# Patient Record
Sex: MALE | Race: WHITE | URBAN - METROPOLITAN AREA
[De-identification: names, ages, dates, MRNs, and addresses within clinical notes are randomized per-mention and may not be internally consistent; named-entity substitution may affect disease eponyms.]

---

## 2017-09-18 ENCOUNTER — HOSPITAL ENCOUNTER (EMERGENCY)
Facility: CLINIC | Age: 37
Discharge: HOME | End: 2017-09-18
Attending: EMERGENCY MEDICINE | Admitting: EMERGENCY MEDICINE

## 2017-09-18 VITALS
HEART RATE: 66 BPM | DIASTOLIC BLOOD PRESSURE: 89 MMHG | SYSTOLIC BLOOD PRESSURE: 143 MMHG | OXYGEN SATURATION: 96 % | TEMPERATURE: 98.3 F | RESPIRATION RATE: 18 BRPM

## 2017-09-18 DIAGNOSIS — M54.6 ACUTE RIGHT-SIDED THORACIC BACK PAIN: Primary | ICD-10-CM

## 2017-09-18 PROCEDURE — 99283 EMERGENCY DEPT VISIT LOW MDM: CPT

## 2017-09-18 PROCEDURE — 25000132 ZZH RX MED GY IP 250 OP 250 PS 637: Performed by: EMERGENCY MEDICINE

## 2017-09-18 RX ORDER — IBUPROFEN 600 MG/1
600 TABLET, FILM COATED ORAL ONCE
Status: COMPLETED | OUTPATIENT
Start: 2017-09-18 | End: 2017-09-18

## 2017-09-18 RX ORDER — LIDOCAINE 50 MG/G
PATCH TOPICAL
Qty: 6 PATCH | Refills: 0 | Status: SHIPPED | OUTPATIENT
Start: 2017-09-18 | End: 2017-12-19

## 2017-09-18 RX ORDER — IBUPROFEN 600 MG/1
600 TABLET, FILM COATED ORAL EVERY 8 HOURS PRN
Qty: 30 TABLET | Refills: 0 | Status: SHIPPED | OUTPATIENT
Start: 2017-09-18

## 2017-09-18 RX ADMIN — IBUPROFEN 600 MG: 600 TABLET ORAL at 08:07

## 2017-09-18 ASSESSMENT — ENCOUNTER SYMPTOMS
BACK PAIN: 1
NUMBNESS: 0

## 2017-09-18 NOTE — LETTER
To Whom it may concern:      Kavon Arnold was seen in our Emergency Department today, 09/18/17.  I expect his condition to improve over the next 2-3 days.  he may return to work/school when improved.    Sincerely,  Delia Talley RN

## 2017-09-18 NOTE — ED AVS SNAPSHOT
[facility] Emergency Department    [address]    [facility] [address]    Phone:  [phone]    Fax:  [phone]                                       Kavon Arnold   MRN: [id number]    Department:  [facility] Emergency Department   Date of Visit:  9/18/2017           Patient Information     Date Of Birth          1980        Your diagnoses for this visit were:     Acute right-sided thoracic back pain        You were seen by Jemal Garcia MD.      Follow-up Information     Follow up with [facility] Emergency Department.    Specialty:  EMERGENCY MEDICINE    Contact information:    [address] [facility] [id number] [phone]        Schedule an appointment as soon as possible for a visit with [facility].    Why:  To establish with primary care provider and follow up with back pain    Contact information:    [address]  [address]  [facility] [id number] [phone]        Discharge Instructions       Discharge Instructions  Back Pain  You were seen today for back pain. Back pain can have many causes, but most will get better without surgery or other specific treatment. Sometimes there is a herniated ( slipped ) disc. We do not usually do MRI scans to look for these right away, since most herniated discs will get better on their own with time.  Today, we did not find any evidence that your back pain was caused by a serious condition. However, sometimes symptoms develop over time and cannot be found during an emergency visit, so it is very important that you follow up with your primary provider.  Generally, every Emergency Department visit should have a follow-up clinic visit with either a primary or a specialty clinic/provider. Please follow-up as instructed by your emergency provider today.    Return to the Emergency Department if:    You develop a fever with your back pain.     You have  weakness or change in sensation in one or both legs.    You lose control of your bowels or bladder, or cannot empty your bladder (cannot pee).    Your pain gets much worse.     Follow-up with your provider:    Unless your pain has completely gone away, please make an appointment with your provider within one week. Most of the routine care for back pain is available in a clinic and not the Emergency Department. You may need further management of your back pain, such as more pain medication, imaging such as an X-ray or MRI, or physical therapy.    What can I do to help myself?    Remain Active -- People are often afraid that they will hurt their back further or delay recovery by remaining active, but this is one of the best things you can do for your back. In fact, staying in bed for a long time to rest is not recommended. Studies have shown that people with low back pain recover faster when they remain active. Movement helps to bring blood flow to the muscles and relieve muscle spasms as well as preventing loss of muscle strength.    Heat -- Using a heating pad can help with low back pain during the first few weeks. Do not sleep with a heating pad, as you can be burned.     Pain medications - You may take a pain medication such as Tylenol  (acetaminophen), Advil , Motrin  (ibuprofen) or Aleve  (naproxen).  If you were given a prescription for medicine here today, be sure to read all of the information (including the package insert) that comes with your prescription.  This will include important information about the medicine, its side effects, and any warnings that you need to know about.  The pharmacist who fills the prescription can provide more information and answer questions you may have about the medicine.  If you have questions or concerns that the pharmacist cannot address, please call or return to the Emergency Department.   Remember that you can always come back to the Emergency Department if you are not  able to see your regular provider in the amount of time listed above, if you get any new symptoms, or if there is anything that worries you.      24 Hour Appointment Hotline       To make an appointment at any [facility] clinic, call [phone] ([phone]). If you don't have a family doctor or clinic, we will help you find one. [facility] clinics are conveniently located to serve the needs of you and your family.             Review of your medicines      START taking        Dose / Directions Last dose taken    ibuprofen 600 MG tablet   Commonly known as:  ADVIL/MOTRIN   Dose:  600 mg   Quantity:  30 tablet        Take 1 tablet (600 mg) by mouth every 8 hours as needed for moderate pain   Refills:  0        lidocaine 5 % Patch   Commonly known as:  LIDODERM   Quantity:  6 patch        Apply up to 2 patches to painful area at once for up to 12 h within a 24 h period.  Remove after 12 hours.   Refills:  0                Prescriptions were sent or printed at these locations (2 Prescriptions)                   Other Prescriptions                Printed at Department/Unit printer (2 of 2)         ibuprofen (ADVIL/MOTRIN) 600 MG tablet               lidocaine (LIDODERM) 5 % Patch                Orders Needing Specimen Collection     None      Pending Results     No orders found from 9/16/2017 to 9/19/2017.            Pending Culture Results     No orders found from 9/16/2017 to 9/19/2017.            Pending Results Instructions     If you had any lab results that were not finalized at the time of your Discharge, you can call the ED Lab Result RN at [phone]. You will be contacted by this team for any positive Lab results or changes in treatment. The nurses are available 7 days a week from 10A to 6:30P.  You can leave a message 24 hours per day and they will return your call.        Test Results From Your Hospital Stay               Clinical Quality Measure: Blood Pressure Screening     Your blood pressure was  "checked while you were in the emergency department today. The last reading we obtained was  BP: (!) 167/109 . Please read the guidelines below about what these numbers mean and what you should do about them.  If your systolic blood pressure (the top number) is less than 120 and your diastolic blood pressure (the bottom number) is less than 80, then your blood pressure is normal. There is nothing more that you need to do about it.  If your systolic blood pressure (the top number) is 120-139 or your diastolic blood pressure (the bottom number) is 80-89, your blood pressure may be higher than it should be. You should have your blood pressure rechecked within a year by a primary care provider.  If your systolic blood pressure (the top number) is 140 or greater or your diastolic blood pressure (the bottom number) is 90 or greater, you may have high blood pressure. High blood pressure is treatable, but if left untreated over time it can put you at risk for heart attack, stroke, or kidney failure. You should have your blood pressure rechecked by a primary care provider within the next 4 weeks.  If your provider in the emergency department today gave you specific instructions to follow-up with your doctor or provider even sooner than that, you should follow that instruction and not wait for up to 4 weeks for your follow-up visit.        Thank you for choosing [facility]       Thank you for choosing [facility] for your care. Our goal is always to provide you with excellent care. Hearing back from our patients is one way we can continue to improve our services. Please take a few minutes to complete the written survey that you may receive in the mail after you visit with us. Thank you!        [facility] Information     [facility] lets you send messages to your doctor, view your test results, renew your prescriptions, schedule appointments and more. To sign up, go to [email or web address] . Click on \"Log in\" on the left side of the " "screen, which will take you to the Welcome page. Then click on \"Sign up Now\" on the right side of the page.     You will be asked to enter the access code listed below, as well as some personal information. Please follow the directions to create your username and password.     Your access code is: [email or web address]  Expires: 2017  8:01 AM     Your access code will  in 90 days. If you need help or a new code, please call your [facility] clinic or [phone].        Care EveryWhere ID     This is your Care EveryWhere ID. This could be used by other organizations to access your [facility] medical records  [id number]        Equal Access to Services     HUNG HERMAN : Ann Oro, alec lancaster, lamar hicks, lei lozano. So [facility] [phone].    ATENCIÓN: Si habla español, tiene a rico disposición servicios gratuitos de asistencia lingüística. Llame al [phone].    We comply with applicable federal civil rights laws and [place] laws. We do not discriminate on the basis of race, color, national origin, age, disability sex, sexual orientation or gender identity.            After Visit Summary       This is your record. Keep this with you and show to your community pharmacist(s) and doctor(s) at your next visit.                  "

## 2017-09-18 NOTE — ED AVS SNAPSHOT
[facility] Emergency Department    [address]    [facility] [address]    Phone:  [phone]    Fax:  [phone]                                       Kavon Arnold   MRN: [id number]    Department:  [facility] Emergency Department   Date of Visit:  9/18/2017           After Visit Summary Signature Page     I have received my discharge instructions, and my questions have been answered. I have discussed any challenges I see with this plan with the nurse or doctor.    ..........................................................................................................................................  Patient/Patient Representative Signature      ..........................................................................................................................................  Patient Representative Print Name and Relationship to Patient    ..................................................               ................................................  Date                                            Time    ..........................................................................................................................................  Reviewed by Signature/Title    ...................................................              ..............................................  Date                                                            Time

## 2017-09-18 NOTE — ED NOTES
Patient presents to the ED with right upper back pain. Reports was loading parts into a vehicle and sneezed and immediately felt sharp pain. Took tylenol with no relief.

## 2017-09-18 NOTE — ED PROVIDER NOTES
History     Chief Complaint:  Back Pain      HPI   Kavon Arnold is a 37 year old male who presents to the emergency department today for evaluation of back pain. The patient reports that he was lifting a heavy bin into his truck when he sneezed violently and immediately experienced severe right-sided back pain. He states that the pain is now less severe and that he took Tylenol around 30 minutes prior to arrival (0630). The patient denies falling, hitting his head or any other injuries. The patient denies any incontinence, numbness or history of IV drug use.    Allergies:  Amoxicillin  Penicillins      Medications:    The patient is currently on no regular medications.    Past Medical History:    History reviewed. No pertinent past medical history.    Past Surgical History:    History reviewed. No pertinent past surgical history.     Family History:    History reviewed. No pertinent family history.     Social History:  The patient was alone.    Review of Systems   Genitourinary:        Denies incontinence   Musculoskeletal: Positive for back pain.   Neurological: Negative for numbness.   All other systems reviewed and are negative.    Physical Exam   First Vitals:  BP: (!) 167/109  Pulse: 66  Temp: 98.3  F (36.8  C)  Resp: 18  SpO2: 99 %    Physical Exam  General: The patient appears uncomfortable  Head:  The scalp, face, and head appear normal  Eyes:  The pupils are equal, round, and reactive to light    There is no nystagmus    Extraocular muscles are intact    Conjunctivae and sclerae are normal  ENT:    The nose is normal    Pinnae are normal    The oropharynx is normal    Uvula is in the midline  Neck:  Normal range of motion    There is no midline cervical spine pain/tenderness  CV:  Regular rate and underlying rhythm     Normal S1 and S2    No S3 or S4    No pathological murmur detected  Resp:  Lungs are clear    There is no tachypnea    Non-labored breathing    No rales    No wheezing   GI:  Abdomen is  soft, there is no rigidity    No distension    No tympani    No rebound tenderness     Non-surgical without peritoneal features  MS:  Back:    The cervical and thoracic spine is non-tender in the midline    The lumbar spine is non-tender in the midline    There is right thoracic paraspinous muscular pain to palpation    Legs:    There is normal motor strength of bilateral lower extremities    There is no sensory abnormality/paresthesia    There is no numbness    There is no radiculopathy    There is normal capillary refill to the toes  Skin:  No rash or acute skin lesions noted.  No shingles noted.  Neuro:  Speech is normal and fluent    Reflexes patellar and achilles are normal.      Brachioradialis and Triceps Reflexes are normal  Psych: Awake. Alert.  Normal affect.  Appropriate interactions.  Lymph: No anterior or posterior cervical lymphadenopathy noted    Emergency Department Course     Interventions:  0807: Ibuprofen 600mg PO     Emergency Department Course:  Nursing notes and vitals reviewed.  0751: I performed an exam of the patient as documented above.   I discussed the treatment plan with the patient. They expressed understanding of this plan and consented to discharge. They will be discharged home with instructions for care and follow up. In addition, the patient will return to the emergency department if their symptoms persist, worsen, if new symptoms arise or if there is any concern.  All questions were answered.     Impression & Plan      Medical Decision Making:  Kavon Arnold presented with back pain.  The pain has improved with interventions in the emergency department. He did not sustain any focal trauma, therefore x-rays are not necessary due to the low likelihood of fracture or subluxation. He  has not had a fever, saddle/perineal anesthesia, bilateral foot numbness, or bowel or bladder dysfunction.  There is no clinical evidence of cauda equina syndrome, discitis, spinal/epidural space  hematoma or abscess.     The neurological exam is normal and his symptoms are consistent with a musculoskeletal strain. There is no current evidence of radiculopathy or myelopathy. The patient will be discharged with pain medications to use as directed.      Kavon was directed to avoid heavy lifting, bending or twisting. He   was told to return for:  increasing pain, numbness, weakness, or bowel or bladder dysfunction.  He  was advised to schedule follow-up with his/her primary doctor within 3 days to re-assess symptoms.    Critical Care time:  none    Diagnosis:    ICD-10-CM    1. Acute right-sided thoracic back pain M54.6      Disposition:  discharged to home with below prescription    Discharge Medications:  New Prescriptions    IBUPROFEN (ADVIL/MOTRIN) 600 MG TABLET    Take 1 tablet (600 mg) by mouth every 8 hours as needed for moderate pain    LIDOCAINE (LIDODERM) 5 % PATCH    Apply up to 2 patches to painful area at once for up to 12 h within a 24 h period.  Remove after 12 hours.     Scribe Disclosure:  Roz DAI, am serving as a scribe at 7:52 AM on 9/18/2017 to document services personally performed by Jemal Garcia MD based on my observations and the provider's statements to me.    9/18/2017   [facility] EMERGENCY DEPARTMENT       Jemal Garcia MD  09/18/17 5179

## 2017-09-21 ENCOUNTER — OFFICE VISIT (OUTPATIENT)
Dept: PEDIATRICS | Facility: CLINIC | Age: 37
End: 2017-09-21
Payer: COMMERCIAL

## 2017-09-21 VITALS
TEMPERATURE: 97.4 F | WEIGHT: 249.5 LBS | BODY MASS INDEX: 35.72 KG/M2 | DIASTOLIC BLOOD PRESSURE: 62 MMHG | HEART RATE: 73 BPM | OXYGEN SATURATION: 95 % | HEIGHT: 70 IN | SYSTOLIC BLOOD PRESSURE: 116 MMHG

## 2017-09-21 DIAGNOSIS — M54.6 ACUTE RIGHT-SIDED THORACIC BACK PAIN: Primary | ICD-10-CM

## 2017-09-21 PROCEDURE — 99203 OFFICE O/P NEW LOW 30 MIN: CPT | Performed by: NURSE PRACTITIONER

## 2017-09-21 RX ORDER — LORATADINE 10 MG/1
10 TABLET ORAL DAILY
COMMUNITY

## 2017-09-21 NOTE — NURSING NOTE
"Chief Complaint   Patient presents with     ER F/U       Initial /62  Pulse 73  Temp 97.4  F (36.3  C) (Tympanic)  Ht 5' 10\" (1.778 m)  Wt 249 lb 8 oz (113.2 kg)  SpO2 95%  BMI 35.8 kg/m2 Estimated body mass index is 35.8 kg/(m^2) as calculated from the following:    Height as of this encounter: 5' 10\" (1.778 m).    Weight as of this encounter: 249 lb 8 oz (113.2 kg).  Medication Reconciliation: complete   Sylvia Juarez, ONEIL    "

## 2017-09-21 NOTE — MR AVS SNAPSHOT
After Visit Summary   9/21/2017    Kavon Arnold    MRN: [id number]           Patient Information     Date Of Birth          1980        Visit Information        Provider Department      9/21/2017 9:40 AM Melonie Romero APRN CNP [facility]        Today's Diagnoses     Acute right-sided thoracic back pain    -  1      Care Instructions    -Please see PT as soon as possible then see me for follow up to clear you for work.           Follow-ups after your visit        Additional Services     [facility] PT, HAND, AND CHIROPRACTIC REFERRAL       **This order will print in the [facility] Scheduling Office**    Physical Therapy, Hand Therapy and Chiropractic Care are available through:    *[facility]  *[facility]  *[facility]    Call one number to schedule at any of the above locations: [phone].    Your provider has referred you to: [facility] - PT and/or Chiropractic Care determined by clinical presentation at [facility] or [facility] Initial Visit    Indication/Reason for Referral: Thoracic Back Pain  Onset of Illness: days  Therapy Orders: Evaluate and Treat  Special Programs: None  Special Request: None    Elan Ansari      Additional Comments for the Therapist or Chiropractor:     Please be aware that coverage of these services is subject to the terms and limitations of your health insurance plan.  Call member services at your health plan with any benefit or coverage questions.      Please bring the following to your appointment:    *Your personal calendar for scheduling future appointments  *Comfortable clothing                  Who to contact     If you have questions or need follow up information about today's clinic visit or your schedule please contact [facility] directly at [phone].  Normal or non-critical lab and imaging results will be communicated to you by MyChart, letter or phone within 4 business  "days after the clinic has received the results. If you do not hear from us within 7 days, please contact the clinic through [facility] or phone. If you have a critical or abnormal lab result, we will notify you by phone as soon as possible.  Submit refill requests through [employer] or call your pharmacy and they will forward the refill request to us. Please allow 3 business days for your refill to be completed.          Additional Information About Your Visit        [employer] Information     [employer] lets you send messages to your doctor, view your test results, renew your prescriptions, schedule appointments and more. To sign up, go to [email or web address] . Click on \"Log in\" on the left side of the screen, which will take you to the Welcome page. Then click on \"Sign up Now\" on the right side of the page.     You will be asked to enter the access code listed below, as well as some personal information. Please follow the directions to create your username and password.     Your access code is: [email or web address]  Expires: 2017  8:01 AM     Your access code will  in 90 days. If you need help or a new code, please call your [facility] clinic or [phone].        Care EveryWhere ID     This is your Care EveryWhere ID. This could be used by other organizations to access your [facility] medical records  [id number]        Your Vitals Were     Pulse Temperature Height Pulse Oximetry BMI (Body Mass Index)       73 97.4  F (36.3  C) (Tympanic) 5' 10\" (1.778 m) 95% 35.8 kg/m2        Blood Pressure from Last 3 Encounters:   17 116/62   17 143/89    Weight from Last 3 Encounters:   17 249 lb 8 oz (113.2 kg)              We Performed the Following     [facility] PT, HAND, AND CHIROPRACTIC REFERRAL        Primary Care Provider    Provider Not In System                Equal Access to Services     HUNG HERMAN : Ann Oro, alec lanc[place], qaybta michelle hicks, lei teixeira " layasmeen lozano. So [facility] [phone].    ATENCIÓN: Si habla renato, tiene a rico disposición servicios gratuitos de asistencia lingüística. Haja al [phone].    We comply with applicable federal civil rights laws and [place] laws. We do not discriminate on the basis of race, color, national origin, age, disability sex, sexual orientation or gender identity.            Thank you!     Thank you for choosing [facility]  for your care. Our goal is always to provide you with excellent care. Hearing back from our patients is one way we can continue to improve our services. Please take a few minutes to complete the written survey that you may receive in the mail after your visit with us. Thank you!             Your Updated Medication List - Protect others around you: Learn how to safely use, store and throw away your medicines at www.disposemymeds.org.          This list is accurate as of: 9/21/17 10:17 AM.  Always use your most recent med list.                   Brand Name Dispense Instructions for use Diagnosis    BEANO PO           ibuprofen 600 MG tablet    ADVIL/MOTRIN    30 tablet    Take 1 tablet (600 mg) by mouth every 8 hours as needed for moderate pain        lidocaine 5 % Patch    LIDODERM    6 patch    Apply up to 2 patches to painful area at once for up to 12 h within a 24 h period.  Remove after 12 hours.        loratadine 10 MG tablet    CLARITIN     Take 10 mg by mouth daily

## 2017-09-21 NOTE — PROGRESS NOTES
"  SUBJECTIVE:   Kavon Arnold is a 37 year old male who presents to clinic today for the following health issues:    ED/UC Followup:    Facility:  [facility] ED  Date of visit: 9/18/17  Reason for visit: back pain - right side thoracic  Current Status: Patient states pain is better - but has not been able to sneeze - tries to prevent because he feels it in his back. Still having some pain Current pain 2/10. Twisting motion is worse.:       Was seen in the ER on 9/18 for LBP  The patient reports that he was lifting a heavy bin into his truck when he sneezed violently and immediately experienced severe right-sided back pain. When he went to the ER the pain was less severe and that he took Tylenol around 30 minutes prior to arrival (0630). The patient denies falling, hitting his head or any other injuries. The patient denies any incontinence, numbness or history of IV drug use.    Not currently using lidocaine. Hasn't needed Advil today. Yesterday was taking Advil and Tylenol. Heating pad was helping a lot.     ROS: const/msk/neuro otherwise negative     OBJECTIVE:  /62  Pulse 73  Temp 97.4  F (36.3  C) (Tympanic)  Ht 5' 10\" (1.778 m)  Wt 249 lb 8 oz (113.2 kg)  SpO2 95%  BMI 35.8 kg/m2  CONSTITUTIONAL: Alert, well-nourished, well-groomed, NAD  RESP: Lungs CTA. No wheeze, rhonchi, rales.  CV: HRRR S1 S2 No MRG. No peripheral edema  MSK: No deformity of spine. No TTP midline thoracic spine. No TTP paraspinal muscles.NEURO: +2 DTRs. Negative SLR.       ASSESSMENT/PLAN:  (M54.6) Acute right-sided thoracic back pain  (primary encounter diagnosis)  Comment: As above. Likely muscle spasm or possible mild disc herniation, improving. Continue NSAIDs and heating pad for pain.   Plan: [facility] REFERRAL        Recommended seeing PT prior to returning to work. Then he should see me to clear him for work as he has a very physical job.   Discussed reasons to seek care urgently.         Melonie " BARBIE Romero.

## 2017-09-22 ENCOUNTER — THERAPY VISIT (OUTPATIENT)
Dept: PHYSICAL THERAPY | Facility: CLINIC | Age: 37
End: 2017-09-22
Payer: COMMERCIAL

## 2017-09-22 DIAGNOSIS — M54.50 ACUTE RIGHT-SIDED LOW BACK PAIN: Primary | ICD-10-CM

## 2017-09-22 PROCEDURE — 97110 THERAPEUTIC EXERCISES: CPT | Mod: GP | Performed by: PHYSICAL THERAPIST

## 2017-09-22 PROCEDURE — 97162 PT EVAL MOD COMPLEX 30 MIN: CPT | Mod: GP | Performed by: PHYSICAL THERAPIST

## 2017-09-22 NOTE — PROGRESS NOTES
Subjective:    HPI                    Objective:    System    Physical Exam    General     ROS     Physical Therapy Initial Evaluation:   Sep 22, 2017  Subjective:   Chief Complaint:    Pain: between the shoulder blade and the spine on the right (at about the level of T7 per indication)   Numbness/Tingling: None   Weakness: unsure, probably just pain   Stiffness: in the back  New/Recurrent/Chronic: New  DOI/onset: 9/18/2017   Referral Date: 9/21/2017  Mechanism of onset: Was bent over picking up a bin and sneezed.   PMH/surgical history/trauma:    - overweight   - appendix procedure  General health as reported by patient: Fair    Medications: anti-inflammatory,   Occupation: Service Tech Job duties: prolonged standing, lifting/carrying, repetitive tasks, operating a assembly, driving, pushing/pulling,   Previous Treatment (Effect): heat (helps), advil (doesn't help),   Imaging: None  AM/PM: same all day  Quality of Pain: aching,   Pain: 5/10 at present, 2/10 at best, 10/10 at worst  Better: heat, warm showers, laying on his back, sitting in his lawn chair  Worse: sitting in stiff chairs, sneezing, reaching (out to the side worse than behind the back),   Progression of Symptoms since onset: better since onset  Sleeping: woke him up at least 5 times, can fall back to sleep   Other current functional challenges: reaching, sleeping, lifting,   Current Functional Status: reaching - pain with reaching out to the side 3/10 today ; lifting - hasn't lifted anything heavier than ~7 LBs to waist height  Previous Functional Status: reaching - no pain or restrictions previously ; lifting - lifting frequently for work, 50+ Lbs. Moves appliances for work  Current HEP/exercise regimen: No other exercise outside of work  Hand/Leg Dominance: right handed  Live with Others: lives alone  Red Flags:   - Patient denies the following: Fever ; Weakness ; Numbness/Tingling ; Change in Bowel or Bladder ; Chest Pain ;   - Patient reports the  following: Pain with Cough / Sneeze / Laughing (stops himself from sneezing to avoid pain) ; Night Pain (positionally dependent) ;     Patient's Goal(s): Find out what limitations he has before returning to work.       Objective:    Posture: Poor sitting posture, increased thoracic kyphosis, rounded shoulders    Gait: WNL    Shoulder Screen: pain with reaching our to the side, lesser pain with reaching behind the back    Cervical Screen: negative    THORACIC AROM: (Major, Moderate, Minimal or Nil loss)  Movement Loss Alirio Mod Min Nil Pain   Flexion   x  cc   Extension    x    Rotation L    x    Rotation R    x    Side Bend L    x cc   Side Bend R    x      Ribs Screen: Levels with dysfunctional ribs   Rib Level L R   Hypo in Inspiration  R 7th rib   Hypo in Expiration     Tenderness without movement dysfunction     Hyper       Dural Signs:   L R   SLR (-) (-)     Thoracic Mobility/Spring Testing: hypomobility, relief with PA to R 7th rib      Manipulation Screening:  Patient denies: Diplopia, Dysphasia, Dizziness, Dysarthria, Drop Attacks, Nausea, Nystagmus, Numbness, Another Manipulation with the last 30 days, Recent Upper Respiratory infection recently, Osteopenia/Osteoporosis/other bone health condition, Prolonged Corticosteroid use, Hx of fracture    Explained benefits, risks, and alternatives.    Other:  - Significant improvement of symptoms following manipulation of the right 7th rib.         Assessment/Plan:      Patient is a 37 year old male with thoracic complaints.    Patient has the following significant findings with corresponding treatment plan.                Diagnosis 1:  Acute right-sided thoracic back pain  Pain -  hot/cold therapy, US, manual therapy, splint/taping/bracing/orthotics, self management, education, directional preference exercise and home program  Decreased ROM/flexibility - manual therapy, therapeutic exercise, therapeutic activity and home program  Decreased joint mobility - manual  therapy, therapeutic exercise, therapeutic activity and home program  Decreased function - therapeutic activities and home program  Impaired posture - neuro re-education, therapeutic activities and home program    Therapy Evaluation Codes:   1) History comprised of:   Personal factors that impact the plan of care:      Anxiety, Profession and Work status.    Comorbidity factors that impact the plan of care are:      Overweight.     Medications impacting care: Anti-inflammatory.  2) Examination of Body Systems comprised of:   Body structures and functions that impact the plan of care:      Thoracic Spine.   Activity limitations that impact the plan of care are:      Lifting, Sleeping and Reaching.  3) Clinical presentation characteristics are:   Evolving/Changing.  4) Decision-Making    Moderate complexity using standardized patient assessment instrument and/or measureable assessment of functional outcome.  Cumulative Therapy Evaluation is: Moderate complexity.    Previous and current functional limitations:  (See Goal Flow Sheet for this information)    Short term and Long term goals: (See Goal Flow Sheet for this information)     Communication ability:  Patient appears to be able to clearly communicate and understand verbal and written communication and follow directions correctly.  Treatment Explanation - The following has been discussed with the patient:   RX ordered/plan of care  Anticipated outcomes  Possible risks and side effects  This patient would benefit from PT intervention to resume normal activities.   Rehab potential is good.    Frequency:  2 X week, once daily  Duration:  for 2 weeks tapering to 1 X a week over 4 weeks  Discharge Plan:  Achieve all LTG.  Independent in home treatment program.  Reach maximal therapeutic benefit.    Please refer to the daily flowsheet for treatment today, total treatment time and time spent performing 1:1 timed codes.

## 2017-09-22 NOTE — Clinical Note
Nathan Wilhelm,  I saw Kavon for his back today. He reports that he had a good day yesterday, but that it was worse today. I was able to do a manipulation to one of his ribs that did not appear to be moving well and he reported a significant improvement in symptoms. I am seeing him again on Tuesday for a follow-up. He was unclear when he should schedule his next appointment with you. I told him that I would pass on my recommendation for return to work for you and have your office call him regarding follow-up. My recommendation is start with at least one additional week off of work to rest and let the injury recover. I agreed with your note that a disc injury is a possibility and I would not want that to get worse if he rushed back to his particular line of work too soon. I am out of the office on Monday, but let me know if you have any questions and I'll get back to you ASAP.  Best, --Kory

## 2017-09-26 ENCOUNTER — THERAPY VISIT (OUTPATIENT)
Dept: PHYSICAL THERAPY | Facility: CLINIC | Age: 37
End: 2017-09-26
Payer: COMMERCIAL

## 2017-09-26 DIAGNOSIS — M54.50 ACUTE RIGHT-SIDED LOW BACK PAIN: ICD-10-CM

## 2017-09-26 PROCEDURE — 97110 THERAPEUTIC EXERCISES: CPT | Mod: GP | Performed by: PHYSICAL THERAPIST

## 2017-09-26 PROCEDURE — 97140 MANUAL THERAPY 1/> REGIONS: CPT | Mod: GP | Performed by: PHYSICAL THERAPIST

## 2017-09-28 ENCOUNTER — OFFICE VISIT (OUTPATIENT)
Dept: PEDIATRICS | Facility: CLINIC | Age: 37
End: 2017-09-28
Payer: COMMERCIAL

## 2017-09-28 VITALS
BODY MASS INDEX: 35.79 KG/M2 | WEIGHT: 250 LBS | TEMPERATURE: 97.7 F | OXYGEN SATURATION: 97 % | HEART RATE: 78 BPM | HEIGHT: 70 IN | DIASTOLIC BLOOD PRESSURE: 74 MMHG | SYSTOLIC BLOOD PRESSURE: 118 MMHG

## 2017-09-28 DIAGNOSIS — Z00.00 HEALTHCARE MAINTENANCE: Primary | ICD-10-CM

## 2017-09-28 DIAGNOSIS — F43.23 ADJUSTMENT DISORDER WITH MIXED ANXIETY AND DEPRESSED MOOD: ICD-10-CM

## 2017-09-28 DIAGNOSIS — L30.9 DERMATITIS: ICD-10-CM

## 2017-09-28 DIAGNOSIS — M54.5 ACUTE RIGHT-SIDED LOW BACK PAIN, WITH SCIATICA PRESENCE UNSPECIFIED: ICD-10-CM

## 2017-09-28 PROCEDURE — 99213 OFFICE O/P EST LOW 20 MIN: CPT | Mod: 25 | Performed by: NURSE PRACTITIONER

## 2017-09-28 PROCEDURE — 99395 PREV VISIT EST AGE 18-39: CPT | Performed by: NURSE PRACTITIONER

## 2017-09-28 ASSESSMENT — PATIENT HEALTH QUESTIONNAIRE - PHQ9
10. IF YOU CHECKED OFF ANY PROBLEMS, HOW DIFFICULT HAVE THESE PROBLEMS MADE IT FOR YOU TO DO YOUR WORK, TAKE CARE OF THINGS AT HOME, OR GET ALONG WITH OTHER PEOPLE: SOMEWHAT DIFFICULT
SUM OF ALL RESPONSES TO PHQ QUESTIONS 1-9: 12
SUM OF ALL RESPONSES TO PHQ QUESTIONS 1-9: 12
5. POOR APPETITE OR OVEREATING: SEVERAL DAYS

## 2017-09-28 ASSESSMENT — ANXIETY QUESTIONNAIRES
1. FEELING NERVOUS, ANXIOUS, OR ON EDGE: NEARLY EVERY DAY
2. NOT BEING ABLE TO STOP OR CONTROL WORRYING: SEVERAL DAYS
GAD7 TOTAL SCORE: 8
3. WORRYING TOO MUCH ABOUT DIFFERENT THINGS: MORE THAN HALF THE DAYS
IF YOU CHECKED OFF ANY PROBLEMS ON THIS QUESTIONNAIRE, HOW DIFFICULT HAVE THESE PROBLEMS MADE IT FOR YOU TO DO YOUR WORK, TAKE CARE OF THINGS AT HOME, OR GET ALONG WITH OTHER PEOPLE: EXTREMELY DIFFICULT
6. BECOMING EASILY ANNOYED OR IRRITABLE: SEVERAL DAYS
7. FEELING AFRAID AS IF SOMETHING AWFUL MIGHT HAPPEN: NOT AT ALL
5. BEING SO RESTLESS THAT IT IS HARD TO SIT STILL: NOT AT ALL

## 2017-09-28 NOTE — MR AVS SNAPSHOT
After Visit Summary   9/28/2017    Kavon Arnold    MRN: [id number]           Patient Information     Date Of Birth          1980        Visit Information        Provider Department      9/28/2017 12:00 PM Melonie Romero APRN [facility]        Today's Diagnoses     Healthcare maintenance    -  1    Adjustment disorder with mixed anxiety and depressed mood        Dermatitis          Care Instructions    -Come back between 8-10 in the morning and fasting    -Start Zoloft every morning.   -See therapy  -See me in a month.   Keep forcing yourself to be social.        -T-gel (Neutrogena) daily on scalp, beard, forehead.   -If no improvement see derm.           Preventive Health Recommendations  Male Ages 26 - 39    Yearly exam:             See your health care provider every year in order to  o   Review health changes.   o   Discuss preventive care.    o   Review your medicines if your doctor has prescribed any.    You should be tested each year for STDs (sexually transmitted diseases), if you re at risk.     After age 35, talk to your provider about cholesterol testing. If you are at risk for heart disease, have your cholesterol tested at least every 5 years.     If you are at risk for diabetes, you should have a diabetes test (fasting glucose).  Shots: Get a flu shot each year. Get a tetanus shot every 10 years.     Nutrition:    Eat at least 5 servings of fruits and vegetables daily.     Eat whole-grain bread, whole-wheat pasta and brown rice instead of white grains and rice.     Talk to your provider about Calcium and Vitamin D.     Lifestyle    Exercise for at least 150 minutes a week (30 minutes a day, 5 days a week). This will help you control your weight and prevent disease.     Limit alcohol to one drink per day.     No smoking.     Wear sunscreen to prevent skin cancer.     See your dentist every six months for an exam and cleaning.             Follow-ups after your  visit        Additional Services     DERMATOLOGY REFERRAL       Your provider has referred you to: FMG: [facility] - [place] [phone]  FHN: [facility] - [place] [phone]   [email or web address]    Please be aware that coverage of these services is subject to the terms and limitations of your health insurance plan.  Call member services at your health plan with any benefit or coverage questions.      Please bring the following with you to your appointment:    (1) Any X-Rays, CTs or MRIs which have been performed.  Contact the facility where they were done to arrange for  prior to your scheduled appointment.    (2) List of current medications  (3) This referral request   (4) Any documents/labs given to you for this referral            MENTAL HEALTH REFERRAL       Your provider has referred you to: FM[facility]: [facility] - Counseling (Individual/Couples/Family) - [facility] - [place] [phone]   *Patient will be contacted by [facility] scheduling partner[facility] to schedule an appointment.  Patients may also call [facility] to schedule.    All scheduling is subject to the client's specific insurance plan & benefits, provider/location availability, and provider clinical specialities.  Please arrive 15 minutes early for your first appointment and bring your completed paperwork.    Please be aware that coverage of these services is subject to the terms and limitations of your health insurance plan.  Call member services at your health plan with any benefit or coverage questions.                  Your next 10 appointments already scheduled     Oct 02, 2017  8:00 AM CDT   [facility] ([facility]  )    [address] [address] [address]   [phone]            Oct 09, 2017  8:00 AM CDT   [facility] ([facility]  )    [address]  "[facility]  [address]  [facility] [address]   [phone]              Future tests that were ordered for you today     Open Future Orders        Priority Expected Expires Ordered    **Testosterone Free and Total FUTURE anytime Routine 2017    LIPID REFLEX TO DIRECT LDL PANEL Routine  2018    Glucose Routine  2018            Who to contact     If you have questions or need follow up information about today's clinic visit or your schedule please contact [facility] directly at [phone].  Normal or non-critical lab and imaging results will be communicated to you by [employer], letter or phone within 4 business days after the clinic has received the results. If you do not hear from us within 7 days, please contact the clinic through [employer] or phone. If you have a critical or abnormal lab result, we will notify you by phone as soon as possible.  Submit refill requests through [employer] or call your pharmacy and they will forward the refill request to us. Please allow 3 business days for your refill to be completed.          Additional Information About Your Visit        MyChart Information     [employer] lets you send messages to your doctor, view your test results, renew your prescriptions, schedule appointments and more. To sign up, go to [email or web address] . Click on \"Log in\" on the left side of the screen, which will take you to the Welcome page. Then click on \"Sign up Now\" on the right side of the page.     You will be asked to enter the access code listed below, as well as some personal information. Please follow the directions to create your username and password.     Your access code is: [email or web address]  Expires: 2017  8:01 AM     Your access code will  in 90 days. If you need help or a new code, please call your [facility] or [phone].        Care EveryWhere ID     This is your Care EveryWhere ID. This could be used by " "other organizations to access your [place] medical records  [id number]        Your Vitals Were     Pulse Temperature Height Pulse Oximetry BMI (Body Mass Index)       78 97.7  F (36.5  C) (Tympanic) 5' 10\" (1.778 m) 97% 35.87 kg/m2        Blood Pressure from Last 3 Encounters:   09/28/17 118/74   09/21/17 116/62   09/18/17 143/89    Weight from Last 3 Encounters:   09/28/17 250 lb (113.4 kg)   09/21/17 249 lb 8 oz (113.2 kg)              We Performed the Following     DEPRESSION ACTION PLAN (DAP)     DERMATOLOGY REFERRAL     MENTAL HEALTH REFERRAL          Today's Medication Changes          These changes are accurate as of: 9/28/17 12:38 PM.  If you have any questions, ask your nurse or doctor.               Start taking these medicines.        Dose/Directions    sertraline 50 MG tablet   Commonly known as:  ZOLOFT   Used for:  Adjustment disorder with mixed anxiety and depressed mood   Started by:  Melonie Romero APRN CNP        Dose:  50 mg   Take 1 tablet (50 mg) by mouth daily   Quantity:  90 tablet   Refills:  1            Where to get your medicines      These medications were sent to [facility] - [address]  [address]  [address], [place] [address]     Phone:  [phone]     sertraline 50 MG tablet                Primary Care Provider Office Phone # Fax #    [facility] [phone]       [address]  [place] [address]        Equal Access to Services     [facility] Hospital[facility] AH: Hadii juve ku hadasho Soomaali, waaxda luqadaha, qaybta kaalmada adeegyada, waxdomingo christopher lord . So [facility] [phone].    ATENCIÓN: Si habla español, tiene a rico disposición servicios gratuitos de asistencia lingüística. Llame al [phone].    We comply with applicable federal civil rights laws and [place] laws. We do not discriminate on the basis of race, color, national origin, age, disability sex, sexual " orientation or gender identity.            Thank you!     Thank you for choosing [facility]  for your care. Our goal is always to provide you with excellent care. Hearing back from our patients is one way we can continue to improve our services. Please take a few minutes to complete the written survey that you may receive in the mail after your visit with us. Thank you!             Your Updated Medication List - Protect others around you: Learn how to safely use, store and throw away your medicines at www.disposemymeds.org.          This list is accurate as of: 9/28/17 12:38 PM.  Always use your most recent med list.                   Brand Name Dispense Instructions for use Diagnosis    BEANO PO           ibuprofen 600 MG tablet    ADVIL/MOTRIN    30 tablet    Take 1 tablet (600 mg) by mouth every 8 hours as needed for moderate pain        lidocaine 5 % Patch    LIDODERM    6 patch    Apply up to 2 patches to painful area at once for up to 12 h within a 24 h period.  Remove after 12 hours.        loratadine 10 MG tablet    CLARITIN     Take 10 mg by mouth daily        sertraline 50 MG tablet    ZOLOFT    90 tablet    Take 1 tablet (50 mg) by mouth daily    Adjustment disorder with mixed anxiety and depressed mood

## 2017-09-28 NOTE — LETTER
[place] [address] [facility]  [address]  [place] [address] [phone]          September 28, 2017    RE:  Kavon SUMI [place]                                                                                                                                                       [address]  [place] [place] [address]            To whom it may concern:    Kavon Arnold is under my professional care. He may return to work Monday, October 2nd without restrictions.        Sincerely,        Melonie Romero RN, CNP

## 2017-09-28 NOTE — PROGRESS NOTES
SUBJECTIVE:   CC: Kavon Arnold is an 37 year old male who presents for preventative health visit.     Physical   Annual:     Getting at least 3 servings of Calcium per day::  NO    Bi-annual eye exam::  Yes    Dental care twice a year::  NO    Sleep apnea or symptoms of sleep apnea::  None    Diet::  Regular (no restrictions)    Frequency of exercise::  None    Taking medications regularly::  Not Applicable    Medication side effects::  Not applicable    Additional concerns today::  YES    Concerns today: dry scalp, dry skin around beard  Anxiety, depression - lack of energy/desire to do things  Red spot by left temple        -------------------------------------    Today's PHQ-2 Score:   PHQ-2 ( 1999 [employer]) 9/28/2017   Q1: Little interest or pleasure in doing things 2   Q2: Feeling down, depressed or hopeless 2   PHQ-2 Score 4   Q1: Little interest or pleasure in doing things More than half the days   Q2: Feeling down, depressed or hopeless More than half the days   PHQ-2 Score 4   Answers for HPI/ROS submitted by the patient on 9/28/2017   PHQ-2 Score: 4  If you checked off any problems, how difficult have these problems made it for you to do your work, take care of things at home, or get along with other people?: Somewhat difficult  PHQ9 TOTAL SCORE: 12    Abuse: Current or Past(Physical, Sexual or Emotional)- No  Do you feel safe in your environment - Yes    Social History   Substance Use Topics     Smoking status: Former Smoker     Quit date: 1/1/2011     Smokeless tobacco: Never Used     Alcohol use Yes      Comment: 2-3 times per month, 2 per time     The patient does not drink >3 drinks per day nor >7 drinks per week.    Last PSA: No results found for: PSA    Reviewed orders with patient. Reviewed health maintenance and updated orders accordingly - Yes  Labs reviewed in EPIC    Reviewed and updated as needed this visit by clinical staff  Tobacco  Allergies  Meds  Med Hx  Surg Hx  Fam Hx  Soc Hx     "    Reviewed and updated as needed this visit by Provider              ROS:  C: NEGATIVE for fever, chills, change in weight  I: NEGATIVE for worrisome rashes, moles or lesions  E: NEGATIVE for vision changes or irritation  ENT: NEGATIVE for ear, mouth and throat problems  R: NEGATIVE for significant cough or SOB  CV: NEGATIVE for chest pain, palpitations or peripheral edema  GI: NEGATIVE for nausea, abdominal pain, heartburn, or change in bowel habits   male: negative for dysuria, hematuria, decreased urinary stream, erectile dysfunction, urethral discharge  M: NEGATIVE for significant arthralgias or myalgia  N: NEGATIVE for weakness, dizziness or paresthesias  P: NEGATIVE for changes in mood or affect    OBJECTIVE:   /74 (Cuff Size: Adult Large)  Pulse 78  Temp 97.7  F (36.5  C) (Tympanic)  Ht 5' 10\" (1.778 m)  Wt 250 lb (113.4 kg)  SpO2 97%  BMI 35.87 kg/m2    EXAM:  GENERAL: healthy, alert and no distress  EYES: Eyes grossly normal to inspection, PERRL and conjunctivae and sclerae normal  HENT: ear canals and TM's normal, nose and mouth without ulcers or lesions  NECK: no adenopathy, no asymmetry, masses, or scars and thyroid normal to palpation  RESP: lungs clear to auscultation - no rales, rhonchi or wheezes  CV: regular rate and rhythm, normal S1 S2, no S3 or S4, no murmur, click or rub, no peripheral edema and peripheral pulses strong  ABDOMEN: soft, nontender, no hepatosplenomegaly, no masses and bowel sounds normal  MS: no gross musculoskeletal defects noted, no edema  SKIN: Red oily patches with flaking on scalp, forehead, eyebrows, and beard.  NEURO: Normal strength and tone, mentation intact and speech normal  PSYCH: mentation appears normal, affect normal/bright  MSK: No deformity of spine. No TTP midline lumbar spine. No TTP paraspinal muscles. No TTP SI joint. 5/5 strength LEs.  NEURO: +2 DTRs. Negative SLR.     ASSESSMENT/PLAN:   1. Healthcare maintenance  - LIPID REFLEX TO DIRECT LDL " "PANEL  -Glucose      (F43.23) Adjustment disorder with mixed anxiety and depressed mood  Comment: patient with a history of mild anxiety and depression. Feels they are both at a low level, but social anxiety specifically is up, especially since moving to a new town and having trouble making friends.   Plan: MENTAL HEALTH REFERRAL, sertraline (ZOLOFT) 50         MG tablet, **Testosterone Free and Total FUTURE        anytime  -Start Zoloft every morning.   -See therapy  -See me in a month.   Keep forcing yourself to be social-Events and adventures, online dating, coffee shops, etc.      (L30.9) Dermatitis  Comment: Appears to be seborrheic dermatitis.  Plan: DERMATOLOGY REFERRAL        Advised T-gel x 2 weeks. If no improvement see derm.     (M54.5) Acute right-sided low back pain, with sciatica presence unspecified  Comment: Much improved. PT recommended RTW next week.  Plan: Letter written to RTW. Stop working and RTC if pain recurs. Continue PT.       COUNSELING:   Reviewed preventive health counseling, as reflected in patient instructions           reports that he quit smoking about 6 years ago. He has never used smokeless tobacco.      Estimated body mass index is 35.87 kg/(m^2) as calculated from the following:    Height as of this encounter: 5' 10\" (1.778 m).    Weight as of this encounter: 250 lb (113.4 kg).   Weight management plan: Discussed healthy diet and exercise guidelines and patient will follow up in 12 months in clinic to re-evaluate.    Counseling Resources:  ATP IV Guidelines  Pooled Cohorts Equation Calculator  FRAX Risk Assessment  ICSI Preventive Guidelines  Dietary Guidelines for Americans, 2010  USDA's MyPlate  ASA Prophylaxis  Lung CA Screening    Melonie Romero, [place] [facility]    "

## 2017-09-28 NOTE — PATIENT INSTRUCTIONS
-Come back between 8-10 in the morning and fasting    -Start Zoloft every morning.   -See therapy  -See me in a month.   Keep forcing yourself to be social.        -T-gel (Neutrogena) daily on scalp, beard, forehead.   -If no improvement see derm.           Preventive Health Recommendations  Male Ages 26 - 39    Yearly exam:             See your health care provider every year in order to  o   Review health changes.   o   Discuss preventive care.    o   Review your medicines if your doctor has prescribed any.    You should be tested each year for STDs (sexually transmitted diseases), if you re at risk.     After age 35, talk to your provider about cholesterol testing. If you are at risk for heart disease, have your cholesterol tested at least every 5 years.     If you are at risk for diabetes, you should have a diabetes test (fasting glucose).  Shots: Get a flu shot each year. Get a tetanus shot every 10 years.     Nutrition:    Eat at least 5 servings of fruits and vegetables daily.     Eat whole-grain bread, whole-wheat pasta and brown rice instead of white grains and rice.     Talk to your provider about Calcium and Vitamin D.     Lifestyle    Exercise for at least 150 minutes a week (30 minutes a day, 5 days a week). This will help you control your weight and prevent disease.     Limit alcohol to one drink per day.     No smoking.     Wear sunscreen to prevent skin cancer.     See your dentist every six months for an exam and cleaning.

## 2017-09-28 NOTE — NURSING NOTE
"Chief Complaint   Patient presents with     Physical       Initial /74 (Cuff Size: Adult Large)  Pulse 78  Temp 97.7  F (36.5  C) (Tympanic)  Ht 5' 10\" (1.778 m)  Wt 250 lb (113.4 kg)  SpO2 97%  BMI 35.87 kg/m2 Estimated body mass index is 35.87 kg/(m^2) as calculated from the following:    Height as of this encounter: 5' 10\" (1.778 m).    Weight as of this encounter: 250 lb (113.4 kg).  Medication Reconciliation: complete   Sylvia Juarez CMA    "

## 2017-09-29 ASSESSMENT — ANXIETY QUESTIONNAIRES: GAD7 TOTAL SCORE: 8

## 2017-09-29 ASSESSMENT — PATIENT HEALTH QUESTIONNAIRE - PHQ9: SUM OF ALL RESPONSES TO PHQ QUESTIONS 1-9: 12

## 2017-10-04 DIAGNOSIS — Z00.00 HEALTHCARE MAINTENANCE: ICD-10-CM

## 2017-10-04 DIAGNOSIS — F43.23 ADJUSTMENT DISORDER WITH MIXED ANXIETY AND DEPRESSED MOOD: ICD-10-CM

## 2017-10-04 LAB
CHOLEST SERPL-MCNC: 210 MG/DL
GLUCOSE SERPL-MCNC: 104 MG/DL (ref 70–99)
HDLC SERPL-MCNC: 51 MG/DL
LDLC SERPL CALC-MCNC: 131 MG/DL
NONHDLC SERPL-MCNC: 159 MG/DL
TRIGL SERPL-MCNC: 138 MG/DL

## 2017-10-04 PROCEDURE — 84270 ASSAY OF SEX HORMONE GLOBUL: CPT | Performed by: NURSE PRACTITIONER

## 2017-10-04 PROCEDURE — 80061 LIPID PANEL: CPT | Performed by: NURSE PRACTITIONER

## 2017-10-04 PROCEDURE — 82947 ASSAY GLUCOSE BLOOD QUANT: CPT | Performed by: NURSE PRACTITIONER

## 2017-10-04 PROCEDURE — 84403 ASSAY OF TOTAL TESTOSTERONE: CPT | Performed by: NURSE PRACTITIONER

## 2017-10-04 PROCEDURE — 36415 COLL VENOUS BLD VENIPUNCTURE: CPT | Performed by: NURSE PRACTITIONER

## 2017-10-08 LAB
SHBG SERPL-SCNC: 30 NMOL/L (ref 11–80)
TESTOST FREE SERPL-MCNC: 9.56 NG/DL (ref 4.7–24.4)
TESTOST SERPL-MCNC: 440 NG/DL (ref 240–950)

## 2017-10-09 PROBLEM — R73.01 IMPAIRED FASTING GLUCOSE: Status: ACTIVE | Noted: 2017-10-09

## 2017-10-09 PROBLEM — E78.2 MIXED HYPERLIPIDEMIA: Status: ACTIVE | Noted: 2017-10-09

## 2017-11-07 ENCOUNTER — OFFICE VISIT (OUTPATIENT)
Dept: PEDIATRICS | Facility: CLINIC | Age: 37
End: 2017-11-07
Payer: COMMERCIAL

## 2017-11-07 VITALS
HEIGHT: 70 IN | DIASTOLIC BLOOD PRESSURE: 74 MMHG | OXYGEN SATURATION: 97 % | SYSTOLIC BLOOD PRESSURE: 116 MMHG | WEIGHT: 242.9 LBS | HEART RATE: 61 BPM | TEMPERATURE: 97.2 F | BODY MASS INDEX: 34.77 KG/M2

## 2017-11-07 DIAGNOSIS — F43.23 ADJUSTMENT DISORDER WITH MIXED ANXIETY AND DEPRESSED MOOD: Primary | ICD-10-CM

## 2017-11-07 DIAGNOSIS — Z23 NEED FOR PROPHYLACTIC VACCINATION AND INOCULATION AGAINST INFLUENZA: ICD-10-CM

## 2017-11-07 PROCEDURE — 90686 IIV4 VACC NO PRSV 0.5 ML IM: CPT | Performed by: NURSE PRACTITIONER

## 2017-11-07 PROCEDURE — 90471 IMMUNIZATION ADMIN: CPT | Performed by: NURSE PRACTITIONER

## 2017-11-07 PROCEDURE — 99213 OFFICE O/P EST LOW 20 MIN: CPT | Mod: 25 | Performed by: NURSE PRACTITIONER

## 2017-11-07 ASSESSMENT — ANXIETY QUESTIONNAIRES
7. FEELING AFRAID AS IF SOMETHING AWFUL MIGHT HAPPEN: SEVERAL DAYS
3. WORRYING TOO MUCH ABOUT DIFFERENT THINGS: SEVERAL DAYS
2. NOT BEING ABLE TO STOP OR CONTROL WORRYING: SEVERAL DAYS
GAD7 TOTAL SCORE: 7
5. BEING SO RESTLESS THAT IT IS HARD TO SIT STILL: NOT AT ALL
6. BECOMING EASILY ANNOYED OR IRRITABLE: MORE THAN HALF THE DAYS
IF YOU CHECKED OFF ANY PROBLEMS ON THIS QUESTIONNAIRE, HOW DIFFICULT HAVE THESE PROBLEMS MADE IT FOR YOU TO DO YOUR WORK, TAKE CARE OF THINGS AT HOME, OR GET ALONG WITH OTHER PEOPLE: SOMEWHAT DIFFICULT
1. FEELING NERVOUS, ANXIOUS, OR ON EDGE: SEVERAL DAYS

## 2017-11-07 ASSESSMENT — PATIENT HEALTH QUESTIONNAIRE - PHQ9
SUM OF ALL RESPONSES TO PHQ QUESTIONS 1-9: 8
5. POOR APPETITE OR OVEREATING: SEVERAL DAYS

## 2017-11-07 NOTE — PATIENT INSTRUCTIONS
-Counseling (Individual/Couples/Family) - [facility] [place] [phone]   -See me in a month

## 2017-11-07 NOTE — NURSING NOTE
"Chief Complaint   Patient presents with     Recheck Medication       Initial /74 (Cuff Size: Adult Large)  Pulse 61  Temp 97.2  F (36.2  C) (Tympanic)  Ht 5' 10\" (1.778 m)  Wt 242 lb 14.4 oz (110.2 kg)  SpO2 97%  BMI 34.85 kg/m2 Estimated body mass index is 34.85 kg/(m^2) as calculated from the following:    Height as of this encounter: 5' 10\" (1.778 m).    Weight as of this encounter: 242 lb 14.4 oz (110.2 kg).  Medication Reconciliation: complete   Sylvia Juarez, ONEIL    "

## 2017-11-07 NOTE — PROGRESS NOTES
"  SUBJECTIVE:   Kavon Arnold is a 37 year old male who presents to clinic today for the following health issues:    Medication Followup of Zoloft    Taking Medication as prescribed: yes - except for last couple days - ran out    Side Effects:  Not sure - possible reduced sex drive    Medication Helping Symptoms:  Not sure - things seem better socially     Having a decreased sex drive but feeling like his social anxiety is better. Hasn't scheduled with a therapist. Working about 60 hours per week    ROS: const/psych/gu otherwise negative     OBJECTIVE:  /74 (Cuff Size: Adult Large)  Pulse 61  Temp 97.2  F (36.2  C) (Tympanic)  Ht 5' 10\" (1.778 m)  Wt 242 lb 14.4 oz (110.2 kg)  SpO2 97%  BMI 34.85 kg/m2  CONSTITUTIONAL: Alert, well-nourished, well-groomed, NAD  RESP: Lungs CTA. No wheeze, rhonchi, rales.  CV: HRRR S1 S2 No MRG. No peripheral edema  PSYCH: Bright affect. Appropriate mentation and speech.       ASSESSMENT/PLAN:  (F43.23) Adjustment disorder with mixed anxiety and depressed mood  (primary encounter diagnosis)  Comment: Improving social anxiety but having sexual side effects.   Plan: He wishes to continue the zoloft and f/u in 1 month.  Encouraged him to call therapy.  Recommended \"Get out of your mind and Into Your Life\" -Melendez  F/U 1 months.     (Z23) Need for prophylactic vaccination and inoculation against influenza  Plan: FLU VAC, SPLIT VIRUS IM > 3 YO (QUADRIVALENT)         [94183], Vaccine Administration, Initial         [00008]              RUTHIE Pendleton-DNP.        Injectable Influenza Immunization Documentation    1.  Is the person to be vaccinated sick today?   No    2. Does the person to be vaccinated have an allergy to a component   of the vaccine?   No  Egg Allergy Algorithm Link    3. Has the person to be vaccinated ever had a serious reaction   to influenza vaccine in the past?   No    4. Has the person to be vaccinated ever had Guillain-Barré syndrome?   No    Form " completed by Sylvia Juarez CMA

## 2017-11-07 NOTE — MR AVS SNAPSHOT
"              After Visit Summary   11/7/2017    Kavon Arnold    MRN: [id number]           Patient Information     Date Of Birth          1980        Visit Information        Provider Department      11/7/2017 7:20 AM Melonie Romero APRN CNP [facility]        Today's Diagnoses     Adjustment disorder with mixed anxiety and depressed mood    -  1    Impaired fasting glucose        Mixed hyperlipidemia        Need for prophylactic vaccination and inoculation against influenza          Care Instructions    -Counseling (Individual/Couples/Family) - [facility] [place] [phone]   -See me in a month          Follow-ups after your visit        Who to contact     If you have questions or need follow up information about today's clinic visit or your schedule please contact [facility] directly at [phone].  Normal or non-critical lab and imaging results will be communicated to you by MyChart, letter or phone within 4 business days after the clinic has received the results. If you do not hear from us within 7 days, please contact the clinic through MyChart or phone. If you have a critical or abnormal lab result, we will notify you by phone as soon as possible.  Submit refill requests through Squ[employer]bler or call your pharmacy and they will forward the refill request to us. Please allow 3 business days for your refill to be completed.          Additional Information About Your Visit        MyChart Information     Squabbler lets you send messages to your doctor, view your test results, renew your prescriptions, schedule appointments and more. To sign up, go to [email or web address] . Click on \"Log in\" on the left side of the screen, which will take you to the Welcome page. Then click on \"Sign up Now\" on the right side of the page.     You will be asked to enter the access code listed below, as well as some personal information. Please follow the directions to create your " "username and password.     Your access code is: [email or web address]  Expires: 2017  7:01 AM     Your access code will  in 90 days. If you need help or a new code, please call your [place] clinic or [phone].        Care EveryWhere ID     This is your Care EveryWhere ID. This could be used by other organizations to access your [facility] medical records  [id number]        Your Vitals Were     Pulse Temperature Height Pulse Oximetry BMI (Body Mass Index)       61 97.2  F (36.2  C) (Tympanic) 5' 10\" (1.778 m) 97% 34.85 kg/m2        Blood Pressure from Last 3 Encounters:   17 116/74   17 118/74   17 116/62    Weight from Last 3 Encounters:   17 242 lb 14.4 oz (110.2 kg)   17 250 lb (113.4 kg)   17 249 lb 8 oz (113.2 kg)              We Performed the Following     FLU VAC, SPLIT VIRUS IM > 3 YO (QUADRIVALENT) [55212]     Vaccine Administration, Initial [19129]        Primary Care Provider Office Phone # Fax #    [place] [facility] [phone] [address] [place] [address]        Equal Access to Services     [facility]: Hadii aad ku hadasho Soomaali, waaxda luqadaha, qaybta kaalmada adeegyada, lei lozano. So [facility] [phone].    ATENCIÓN: Si habla español, tiene a rico disposición servicios gratuitos de asistencia lingüística. Llame al [phone].    We comply with applicable federal civil rights laws and [place] laws. We do not discriminate on the basis of race, color, national origin, age, disability, sex, sexual orientation, or gender identity.            Thank you!     Thank you for choosing [facility]  for your care. Our goal is always to provide you with excellent care. Hearing back from our patients is one way we can continue to improve our services. Please take a few minutes to complete the written survey that you may receive in the mail after your visit with us. Thank " you!             Your Updated Medication List - Protect others around you: Learn how to safely use, store and throw away your medicines at www.disposemymeds.org.          This list is accurate as of: 11/7/17  7:45 AM.  Always use your most recent med list.                   Brand Name Dispense Instructions for use Diagnosis    BEANO PO           ibuprofen 600 MG tablet    ADVIL/MOTRIN    30 tablet    Take 1 tablet (600 mg) by mouth every 8 hours as needed for moderate pain        lidocaine 5 % Patch    LIDODERM    6 patch    Apply up to 2 patches to painful area at once for up to 12 h within a 24 h period.  Remove after 12 hours.        loratadine 10 MG tablet    CLARITIN     Take 10 mg by mouth daily        sertraline 50 MG tablet    ZOLOFT    90 tablet    Take 1 tablet (50 mg) by mouth daily    Adjustment disorder with mixed anxiety and depressed mood

## 2017-11-08 ASSESSMENT — ANXIETY QUESTIONNAIRES: GAD7 TOTAL SCORE: 7

## 2017-12-08 PROBLEM — M54.50 ACUTE RIGHT-SIDED LOW BACK PAIN: Status: RESOLVED | Noted: 2017-09-22 | Resolved: 2017-12-08

## 2017-12-19 ENCOUNTER — OFFICE VISIT (OUTPATIENT)
Dept: PEDIATRICS | Facility: CLINIC | Age: 37
End: 2017-12-19
Payer: COMMERCIAL

## 2017-12-19 VITALS
SYSTOLIC BLOOD PRESSURE: 114 MMHG | BODY MASS INDEX: 34.46 KG/M2 | DIASTOLIC BLOOD PRESSURE: 72 MMHG | WEIGHT: 240.7 LBS | HEART RATE: 72 BPM | OXYGEN SATURATION: 96 % | HEIGHT: 70 IN | TEMPERATURE: 97.9 F

## 2017-12-19 DIAGNOSIS — F43.23 ADJUSTMENT DISORDER WITH MIXED ANXIETY AND DEPRESSED MOOD: ICD-10-CM

## 2017-12-19 PROCEDURE — 99213 OFFICE O/P EST LOW 20 MIN: CPT | Performed by: NURSE PRACTITIONER

## 2017-12-19 ASSESSMENT — ANXIETY QUESTIONNAIRES
3. WORRYING TOO MUCH ABOUT DIFFERENT THINGS: MORE THAN HALF THE DAYS
1. FEELING NERVOUS, ANXIOUS, OR ON EDGE: SEVERAL DAYS
IF YOU CHECKED OFF ANY PROBLEMS ON THIS QUESTIONNAIRE, HOW DIFFICULT HAVE THESE PROBLEMS MADE IT FOR YOU TO DO YOUR WORK, TAKE CARE OF THINGS AT HOME, OR GET ALONG WITH OTHER PEOPLE: SOMEWHAT DIFFICULT
5. BEING SO RESTLESS THAT IT IS HARD TO SIT STILL: NOT AT ALL
6. BECOMING EASILY ANNOYED OR IRRITABLE: SEVERAL DAYS
GAD7 TOTAL SCORE: 6
7. FEELING AFRAID AS IF SOMETHING AWFUL MIGHT HAPPEN: NOT AT ALL
2. NOT BEING ABLE TO STOP OR CONTROL WORRYING: MORE THAN HALF THE DAYS

## 2017-12-19 ASSESSMENT — PATIENT HEALTH QUESTIONNAIRE - PHQ9
5. POOR APPETITE OR OVEREATING: NOT AT ALL
SUM OF ALL RESPONSES TO PHQ QUESTIONS 1-9: 5

## 2017-12-19 NOTE — NURSING NOTE
"Chief Complaint   Patient presents with     Depression     Anxiety       Initial /72 (Cuff Size: Adult Large)  Pulse 72  Temp 97.9  F (36.6  C) (Tympanic)  Ht 5' 10\" (1.778 m)  Wt 240 lb 11.2 oz (109.2 kg)  SpO2 96%  BMI 34.54 kg/m2 Estimated body mass index is 34.54 kg/(m^2) as calculated from the following:    Height as of this encounter: 5' 10\" (1.778 m).    Weight as of this encounter: 240 lb 11.2 oz (109.2 kg).  Medication Reconciliation: complete   Sylvia Juarez, ONEIL    "

## 2017-12-19 NOTE — PROGRESS NOTES
"  SUBJECTIVE:   Kavon Arnold is a 37 year old male who presents to clinic today for the following health issues:    Patient here today for follow up.  Depression and Anxiety Follow-Up    Status since last visit: Improved     Other associated symptoms:None    Complicating factors:     Significant life event: No     Current substance abuse: None    HAS HE SCHEDULED WITH THERAPY-IF NOT, WHY?  Has not scheduled - states \"every time he called their office was closed\"       TYRELL-7 SCORE 9/28/2017 11/7/2017 12/19/2017   Total Score 8 7 6     ROS: const/psych otherwise negative     OBJECTIVE:  /72 (Cuff Size: Adult Large)  Pulse 72  Temp 97.9  F (36.6  C) (Tympanic)  Ht 5' 10\" (1.778 m)  Wt 240 lb 11.2 oz (109.2 kg)  SpO2 96%  BMI 34.54 kg/m2  CONSTITUTIONAL: Alert, well-nourished, well-groomed, NAD  RESP: Lungs CTA. No wheeze, rhonchi, rales.  CV: HRRR S1 S2 No MRG. No peripheral edema  PSYCH: Bright affect. Appropriate mentation and speech.       ASSESSMENT/PLAN:  (F43.23) Adjustment disorder with mixed anxiety and depressed mood  Comment: Feeling better. Has a girlfriend now. Hasn't seen therapy yet.  Plan: MENTAL HEALTH REFERRAL  - Adult; Outpatient         Treatment; Individual/Couples/Family/Group         Therapy/Health Psychology; G: [facility] [phone]; We will         contact you to schedule the appointment or         please call with any questions, sertraline         (ZOLOFT) 50 MG tablet        Continue Zoloft. Start therapy.        F/U 6 months. Will try weaning at that time if appropriate.               Melonie Romero, FNP-DNP.        "

## 2017-12-19 NOTE — MR AVS SNAPSHOT
After Visit Summary   12/19/2017    Kavon Arnold    MRN: [id number]           Patient Information     Date Of Birth          1980        Visit Information        Provider Department      12/19/2017 7:20 AM Melonie Romero APRN CNP [facility]        Today's Diagnoses     Adjustment disorder with mixed anxiety and depressed mood          Care Instructions    Call therapy  [phone]    See me in 6 months          Follow-ups after your visit        Additional Services     MENTAL HEALTH REFERRAL  - Adult; Outpatient Treatment; Individual/Couples/Family/Group Therapy/Health Psychology; FMG: [facility] [phone]; We will contact you to schedule the appointment or please call with any questions       All scheduling is subject to the client's specific insurance plan & benefits, provider/location availability, and provider clinical specialities.  Please arrive 15 minutes early for your first appointment and bring your completed paperwork.    Please be aware that coverage of these services is subject to the terms and limitations of your health insurance plan.  Call member services at your health plan with any benefit or coverage questions.                            Who to contact     If you have questions or need follow up information about today's clinic visit or your schedule please contact [facility] directly at [phone].  Normal or non-critical lab and imaging results will be communicated to you by MyChart, letter or phone within 4 business days after the clinic has received the results. If you do not hear from us within 7 days, please contact the clinic through MyChart or phone. If you have a critical or abnormal lab result, we will notify you by phone as soon as possible.  Submit refill requests through [employer] or call your pharmacy and they will forward the refill request to us. Please allow 3 business days for your refill to be  "completed.          Additional Information About Your Visit        IQcardharBodeTree Information     HardDrones lets you send messages to your doctor, view your test results, renew your prescriptions, schedule appointments and more. To sign up, go to [email or web address] . Click on \"Log in\" on the left side of the screen, which will take you to the Welcome page. Then click on \"Sign up Now\" on the right side of the page.     You will be asked to enter the access code listed below, as well as some personal information. Please follow the directions to create your username and password.     Your access code is: [email or web address]  Expires: 3/19/2018  7:36 AM     Your access code will  in 90 days. If you need help or a new code, please call your [facility] clinic or [phone].        Care EveryWhere ID     This is your Care EveryWhere ID. This could be used by other organizations to access your [facility] medical records  [id number]        Your Vitals Were     Pulse Temperature Height Pulse Oximetry BMI (Body Mass Index)       72 97.9  F (36.6  C) (Tympanic) 5' 10\" (1.778 m) 96% 34.54 kg/m2        Blood Pressure from Last 3 Encounters:   17 114/72   17 116/74   17 118/74    Weight from Last 3 Encounters:   17 240 lb 11.2 oz (109.2 kg)   17 242 lb 14.4 oz (110.2 kg)   17 250 lb (113.4 kg)              We Performed the Following     MENTAL HEALTH REFERRAL  - Adult; Outpatient Treatment; Individual/Couples/Family/Group Therapy/Health Psychology; FMG: [facility] [phone]; We will contact you to schedule the appointment or please call with any questions          Where to get your medicines      These medications were sent to [facility] LIANA Oakes [address]   [address] [address]     Phone:  [phone]     sertraline 50 MG tablet          Primary Care Provider Office Phone # Fax #    Melonie Romero, " APRN [phone] [address] [place] [address]        Equal Access to Services     BAYRON HERMAN : Hadii juve cantu corina Oro, waciriloda lupito, qaybta kadarlineda kurt, lei wilmerkaitlin draperluzmaria blake. So [facility] [phone].    ATENCIÓN: Si habla español, tiene a rico disposición servicios gratuitos de asistencia lingüística. Llame al [phone].    We comply with applicable federal civil rights laws and [place] laws. We do not discriminate on the basis of race, color, national origin, age, disability, sex, sexual orientation, or gender identity.            Thank you!     Thank you for choosing [facility]  for your care. Our goal is always to provide you with excellent care. Hearing back from our patients is one way we can continue to improve our services. Please take a few minutes to complete the written survey that you may receive in the mail after your visit with us. Thank you!             Your Updated Medication List - Protect others around you: Learn how to safely use, store and throw away your medicines at www.disposemymeds.org.          This list is accurate as of: 12/19/17  7:36 AM.  Always use your most recent med list.                   Brand Name Dispense Instructions for use Diagnosis    BEANO PO           ibuprofen 600 MG tablet    ADVIL/MOTRIN    30 tablet    Take 1 tablet (600 mg) by mouth every 8 hours as needed for moderate pain        loratadine 10 MG tablet    CLARITIN     Take 10 mg by mouth daily        sertraline 50 MG tablet    ZOLOFT    90 tablet    Take 1 tablet (50 mg) by mouth daily    Adjustment disorder with mixed anxiety and depressed mood

## 2017-12-20 ASSESSMENT — ANXIETY QUESTIONNAIRES: GAD7 TOTAL SCORE: 6

## 2018-04-18 ENCOUNTER — APPOINTMENT (OUTPATIENT)
Dept: GENERAL RADIOLOGY | Facility: CLINIC | Age: 38
End: 2018-04-18
Attending: EMERGENCY MEDICINE
Payer: COMMERCIAL

## 2018-04-18 ENCOUNTER — HOSPITAL ENCOUNTER (EMERGENCY)
Facility: CLINIC | Age: 38
Discharge: HOME | End: 2018-04-18
Attending: EMERGENCY MEDICINE | Admitting: EMERGENCY MEDICINE
Payer: COMMERCIAL

## 2018-04-18 VITALS
TEMPERATURE: 97.3 F | DIASTOLIC BLOOD PRESSURE: 85 MMHG | WEIGHT: 250 LBS | HEIGHT: 71 IN | BODY MASS INDEX: 35 KG/M2 | RESPIRATION RATE: 18 BRPM | SYSTOLIC BLOOD PRESSURE: 135 MMHG | OXYGEN SATURATION: 99 % | HEART RATE: 71 BPM

## 2018-04-18 DIAGNOSIS — S30.0XXA CONTUSION OF SACRUM, INITIAL ENCOUNTER: ICD-10-CM

## 2018-04-18 PROCEDURE — 72220 X-RAY EXAM SACRUM TAILBONE: CPT

## 2018-04-18 PROCEDURE — 25000132 ZZH RX MED GY IP 250 OP 250 PS 637: Performed by: EMERGENCY MEDICINE

## 2018-04-18 PROCEDURE — 99283 EMERGENCY DEPT VISIT LOW MDM: CPT

## 2018-04-18 RX ORDER — HYDROCODONE BITARTRATE AND ACETAMINOPHEN 5; 325 MG/1; MG/1
1 TABLET ORAL EVERY 6 HOURS PRN
Qty: 8 TABLET | Refills: 0 | Status: SHIPPED | OUTPATIENT
Start: 2018-04-18

## 2018-04-18 RX ORDER — IBUPROFEN 600 MG/1
600 TABLET, FILM COATED ORAL ONCE
Status: COMPLETED | OUTPATIENT
Start: 2018-04-18 | End: 2018-04-18

## 2018-04-18 RX ADMIN — IBUPROFEN 600 MG: 600 TABLET ORAL at 08:35

## 2018-04-18 ASSESSMENT — ENCOUNTER SYMPTOMS
NECK PAIN: 1
BACK PAIN: 1
ARTHRALGIAS: 1
NUMBNESS: 0

## 2018-04-18 NOTE — ED AVS SNAPSHOT
[facility] Emergency Department    [address]    [facility] [address]    Phone:  [phone]    Fax:  [phone]                                       Kavon Arnold   MRN: [id number]    Department:  [facility] Emergency Department   Date of Visit:  4/18/2018           After Visit Summary Signature Page     I have received my discharge instructions, and my questions have been answered. I have discussed any challenges I see with this plan with the nurse or doctor.    ..........................................................................................................................................  Patient/Patient Representative Signature      ..........................................................................................................................................  Patient Representative Print Name and Relationship to Patient    ..................................................               ................................................  Date                                            Time    ..........................................................................................................................................  Reviewed by Signature/Title    ...................................................              ..............................................  Date                                                            Time

## 2018-04-18 NOTE — LETTER
April 18, 2018      To Whom It May Concern:      Kavon Arnold was seen in our Emergency Department today, 04/18/18.  I expect his condition to improve over the next 1-3 days.  He may return to work when improved.    Sincerely,        Leia Berger RN

## 2018-04-18 NOTE — ED PROVIDER NOTES
"  History     Chief Complaint:  Back Pain      The history is provided by the patient.      Kavon Arnold is a 37 year old male who presents with back pain. Patient reports stepping out onto his steps this morning and slipping on some ice. His feet went out in front of him and he fell back onto his tailbone then slid down 5-6 steps hitting back and neck during the fall. No loss of consciousness. Pain was severe prompting visit to the emergency department. Currently rates pain at 7/10 in severity in tailbone, back, and neck with stiffness in his back and neck as well. Pain is worse with ambulation and when sitting down. He does note experiencing an issue with his back earlier in the year after sneezing while bending over and popping a rib out posteriorly. He denies numbness/tingling, bowel or bladder incontinence, or other complaint.     Allergies:  Amoxicillin  Penicillins     Medications:    Beano  Loratadine     Past Medical History:    Thoracic back pain   HLD  Adjustment disorder with mixed anxiety and depressed mood    Past Surgical History:    Abdominal surgery     Family History:    HTN  Diabetes  Heart murmur  Depression    Social History:  Presents alone   Tobacco use: Former smoker, QD 2011  Alcohol use: 2 drinks 2-3x monthly   PCP: Melonie Romero    Marital Status:  Single    Review of Systems   Musculoskeletal: Positive for arthralgias, back pain and neck pain.   Neurological: Negative for numbness.        (-) incontinence   All other systems reviewed and are negative.    Physical Exam     Patient Vitals for the past 24 hrs:   BP Temp Temp src Pulse Resp SpO2 Height Weight   04/18/18 0710 135/85 97.3  F (36.3  C) Oral 71 18 99 % 1.803 m (5' 11\") 113.4 kg (250 lb)      Physical Exam  Constitutional: Alert, attentive  HENT:    Nose: Nose normal.    Mouth/Throat: Oropharynx is clear, mucous membranes are moist  Eyes: EOM are normal. Pupils are equal, round, and reactive to light.   CV: brisk " capillary refill to the distal extremities, 2+ DP pulses bilaterally.  Chest: Effort normal and breath sounds normal.   GI: No distension. There is no tenderness to the RUQ, RLQ or LLQ. No Quiñones's sign or McBurney's point tenderness. No palpable, pulsatile mass.  MSK: Normal range of motion. No midline T/L spine tenderness; + tenderness to the mid sacrum, no stepoff or deformity noted  Neurological: Alert, attentive.  GCS 15; 5/5 strength throughout the bilateral lower extremities (hip flexion/extension, knee flexion/extension, DF/PF, EHL/FHL); sensation intact to light touch throughout L2-S1 distributions to the lower extremities; 2+ DTRs to the bilateral lower extremities (patellar, achilles); normal gait  Skin: Skin is warm and dry.       Emergency Department Course   Imaging:  Radiographic findings were communicated with the patient who voiced understanding of the findings.    XR Sacrum and Coccyx 2 Views:  IMPRESSION: No acute osseous abnormality.    Imaging independently reviewed and agree with radiologist interpretation.       Interventions:  0835: Ibuprofen 600 mg PO       Emergency Department Course:  Past medical records, nursing notes, and vitals reviewed.  0816: I performed an exam of the patient and obtained history, as documented above.  Above interventions provided.   The patient was sent for a XR while in the emergency department, findings above.   0816: I rechecked the patient. Findings and plan explained to the Patient. Patient discharged home with instructions regarding supportive care, medications, and reasons to return. The importance of close follow-up was reviewed.      Impression & Plan      Medical Decision Making:  Kavon Arnold is a 37 year old male who presents for evaluation of slip and fall with sacral pain.  There are no findings to suggest cauda equina on exam nor symptoms.  X-ray shows no acute osseous abnormality.  I discussed occult fracture as a potential and supportive cares  for his injury.  I recommended primary care follow-up in 2-3 days and strict return precautions for worse pain, weakness, incontinence, or any other concerns.  He is ambulatory on recheck and safe for discharge.    Diagnosis:    ICD-10-CM    1. Contusion of sacrum, initial encounter S30.0XXA        Disposition:  Discharged to home with plan as outlined.    Discharge Medications:  New Prescriptions    HYDROCODONE-ACETAMINOPHEN (NORCO) 5-325 MG PER TABLET    Take 1 tablet by mouth every 6 hours as needed for moderate to severe pain         Ryan DAI, lashaun serving as a scribe at 8:16 AM on 4/18/2018 to document services personally performed by Melvin Reddy MD based on my observations and the provider's statements to me.    4/18/2018   [facility] EMERGENCY DEPARTMENT       Melvin Reddy MD  04/18/18 9908

## 2018-04-18 NOTE — ED AVS SNAPSHOT
[facility] Emergency Department    [address] [place] [address]    Phone:  [phone]    Fax:  [phone]                                       Kavon Arnold   MRN: [id number]    Department:  [facility] Emergency Department   Date of Visit:  4/18/2018           Patient Information     Date Of Birth          1980        Your diagnoses for this visit were:     Contusion of sacrum, initial encounter        You were seen by Mlevin Reddy MD.      Follow-up Information     Follow up with Melonie Romero APRN CNP In 3 days.    Specialty:  Nurse Practitioner    Contact information:    [facility] [place] [id number] [phone]          Discharge Instructions       Discharge Instructions  Back Pain  You were seen today for back pain. Back pain can have many causes, but most will get better without surgery or other specific treatment. Sometimes there is a herniated ( slipped ) disc. We do not usually do MRI scans to look for these right away, since most herniated discs will get better on their own with time.  Today, we did not find any evidence that your back pain was caused by a serious condition. However, sometimes symptoms develop over time and cannot be found during an emergency visit, so it is very important that you follow up with your primary provider.  Generally, every Emergency Department visit should have a follow-up clinic visit with either a primary or a specialty clinic/provider. Please follow-up as instructed by your emergency provider today.    Return to the Emergency Department if:    You develop a fever with your back pain.     You have weakness or change in sensation in one or both legs.    You lose control of your bowels or bladder, or cannot empty your bladder (cannot pee).    Your pain gets much worse.     Follow-up with your provider:    Unless your pain has completely gone away, please make an appointment with  your provider within one week. Most of the routine care for back pain is available in a clinic and not the Emergency Department. You may need further management of your back pain, such as more pain medication, imaging such as an X-ray or MRI, or physical therapy.    What can I do to help myself?    Remain Active -- People are often afraid that they will hurt their back further or delay recovery by remaining active, but this is one of the best things you can do for your back. In fact, staying in bed for a long time to rest is not recommended. Studies have shown that people with low back pain recover faster when they remain active. Movement helps to bring blood flow to the muscles and relieve muscle spasms as well as preventing loss of muscle strength.    Heat -- Using a heating pad can help with low back pain during the first few weeks. Do not sleep with a heating pad, as you can be burned.     Pain medications - You may take a pain medication such as Tylenol  (acetaminophen), Advil , Motrin  (ibuprofen) or Aleve  (naproxen).  If you were given a prescription for medicine here today, be sure to read all of the information (including the package insert) that comes with your prescription.  This will include important information about the medicine, its side effects, and any warnings that you need to know about.  The pharmacist who fills the prescription can provide more information and answer questions you may have about the medicine.  If you have questions or concerns that the pharmacist cannot address, please call or return to the Emergency Department.   Remember that you can always come back to the Emergency Department if you are not able to see your regular provider in the amount of time listed above, if you get any new symptoms, or if there is anything that worries you.      24 Hour Appointment Hotline       To make an appointment at any [facility], call [phone] ([phone]). If you don't have a  family doctor or clinic, we will help you find one. [facility] are conveniently located to serve the needs of you and your family.             Review of your medicines      START taking        Dose / Directions Last dose taken    HYDROcodone-acetaminophen 5-325 MG per tablet   Commonly known as:  NORCO   Dose:  1 tablet   Quantity:  8 tablet        Take 1 tablet by mouth every 6 hours as needed for moderate to severe pain   Refills:  0          Our records show that you are taking the medicines listed below. If these are incorrect, please call your family doctor or clinic.        Dose / Directions Last dose taken    BEANO PO        Refills:  0        ibuprofen 600 MG tablet   Commonly known as:  ADVIL/MOTRIN   Dose:  600 mg   Quantity:  30 tablet        Take 1 tablet (600 mg) by mouth every 8 hours as needed for moderate pain   Refills:  0        loratadine 10 MG tablet   Commonly known as:  CLARITIN   Dose:  10 mg        Take 10 mg by mouth daily   Refills:  0                Information about OPIOIDS     PRESCRIPTION OPIOIDS: WHAT YOU NEED TO KNOW   You have a prescription for an opioid (narcotic) pain medicine. Opioids can cause addiction. If you have a history of chemical dependency of any type, you are at a higher risk of becoming addicted to opioids. Only take this medicine after all other options have been tried. Take it for as short a time and as few doses as possible.     Do not:    Drive. If you drive while taking these medicines, you could be arrested for driving under the influence (DUI).    Operate heavy machinery    Do any other dangerous activities while taking these medicines.     Drink any alcohol while taking these medicines.      Take with any other medicines that contain acetaminophen. Read all labels carefully. Look for the word  acetaminophen  or  Tylenol.  Ask your pharmacist if you have questions or are unsure.    Store your pills in a secure place, locked if possible. We will not  replace any lost or stolen medicine. If you don t finish your medicine, please throw away (dispose) as directed by your pharmacist. The [place] [employer]ution [employer] has more information about safe disposal: https://www.pca.state.[place]/living-green/managing-unwanted-medications    All opioids tend to cause constipation. Drink plenty of water and eat foods that have a lot of fiber, such as fruits, vegetables, prune juice, apple juice and high-fiber cereal. Take a laxative (Miralax, milk of magnesia, Colace, Senna) if you don t move your bowels at least every other day.         Prescriptions were sent or printed at these locations (1 Prescription)                   Other Prescriptions                Printed at Department/Unit printer (1 of 1)         HYDROcodone-acetaminophen (NORCO) 5-325 MG per tablet                Procedures and tests performed during your visit     XR Sacrum and Coccyx 2 Views      Orders Needing Specimen Collection     None      Pending Results     No orders found from 4/16/2018 to 4/19/2018.            Pending Culture Results     No orders found from 4/16/2018 to 4/19/2018.            Pending Results Instructions     If you had any lab results that were not finalized at the time of your Discharge, you can call the ED Lab Result RN at [phone]. You will be contacted by this team for any positive Lab results or changes in treatment. The nurses are available 7 days a week from 10A to 6:30P.  You can leave a message 24 hours per day and they will return your call.        Test Results From Your Hospital Stay        4/18/2018  9:27 AM      Narrative     XR SACRUM AND COCCYX 2 VW 4/18/2018 9:03 AM    HISTORY: Sacral pain after fall.    COMPARISON: None.    FINDINGS: Sacroiliac joints are patent and symmetric. Arcuate lines  appear symmetric. Sacrococcygeal alignment is anatomic.        Impression     IMPRESSION: No acute osseous abnormality.    GABBI VAN MD                Clinical Quality  Measure: Blood Pressure Screening     Your blood pressure was checked while you were in the emergency department today. The last reading we obtained was  BP: 135/85 . Please read the guidelines below about what these numbers mean and what you should do about them.  If your systolic blood pressure (the top number) is less than 120 and your diastolic blood pressure (the bottom number) is less than 80, then your blood pressure is normal. There is nothing more that you need to do about it.  If your systolic blood pressure (the top number) is 120-139 or your diastolic blood pressure (the bottom number) is 80-89, your blood pressure may be higher than it should be. You should have your blood pressure rechecked within a year by a primary care provider.  If your systolic blood pressure (the top number) is 140 or greater or your diastolic blood pressure (the bottom number) is 90 or greater, you may have high blood pressure. High blood pressure is treatable, but if left untreated over time it can put you at risk for heart attack, stroke, or kidney failure. You should have your blood pressure rechecked by a primary care provider within the next 4 weeks.  If your provider in the emergency department today gave you specific instructions to follow-up with your doctor or provider even sooner than that, you should follow that instruction and not wait for up to 4 weeks for your follow-up visit.        Thank you for choosing [facility]       Thank you for choosing [facility] for your care. Our goal is always to provide you with excellent care. Hearing back from our patients is one way we can continue to improve our services. Please take a few minutes to complete the written survey that you may receive in the mail after you visit with us. Thank you!        [employer] Information     [facility] gives you secure access to your electronic health record. If you see a primary care provider, you can also send messages to your care team and make  appointments. If you have questions, please call your primary care clinic.  If you do not have a primary care provider, please call [phone] and they will assist you.        Care EveryWhere ID     This is your Care EveryWhere ID. This could be used by other organizations to access your [facility] medical records  [id number]        Equal Access to Services     HUNG HERMAN : Ann Oro, alec lancaster, lei mccarty . So [facility] [phone].    ATENCIÓN: Si habla español, tiene a rico disposición servicios gratuitos de asistencia lingüística. Llame al [phone].    We comply with applicable federal civil rights laws and [place] laws. We do not discriminate on the basis of race, color, national origin, age, disability, sex, sexual orientation, or gender identity.            After Visit Summary       This is your record. Keep this with you and show to your community pharmacist(s) and doctor(s) at your next visit.

## 2018-09-06 ENCOUNTER — MYC MEDICAL ADVICE (OUTPATIENT)
Dept: PEDIATRICS | Facility: CLINIC | Age: 38
End: 2018-09-06

## 2018-09-07 NOTE — TELEPHONE ENCOUNTER
Responded to the Pt. Advised E-visit. He is requesting a new medication.     Roz Calero RN -- [facility] Workforce

## 2020-03-11 ENCOUNTER — HEALTH MAINTENANCE LETTER (OUTPATIENT)
Age: 40
End: 2020-03-11

## 2020-12-27 ENCOUNTER — HEALTH MAINTENANCE LETTER (OUTPATIENT)
Age: 40
End: 2020-12-27

## 2021-04-25 ENCOUNTER — HEALTH MAINTENANCE LETTER (OUTPATIENT)
Age: 41
End: 2021-04-25

## 2021-10-09 ENCOUNTER — HEALTH MAINTENANCE LETTER (OUTPATIENT)
Age: 41
End: 2021-10-09

## 2022-05-21 ENCOUNTER — HEALTH MAINTENANCE LETTER (OUTPATIENT)
Age: 42
End: 2022-05-21

## 2022-09-17 ENCOUNTER — HEALTH MAINTENANCE LETTER (OUTPATIENT)
Age: 42
End: 2022-09-17

## 2023-06-04 ENCOUNTER — HEALTH MAINTENANCE LETTER (OUTPATIENT)
Age: 43
End: 2023-06-04